# Patient Record
Sex: MALE | Race: BLACK OR AFRICAN AMERICAN
[De-identification: names, ages, dates, MRNs, and addresses within clinical notes are randomized per-mention and may not be internally consistent; named-entity substitution may affect disease eponyms.]

---

## 2018-04-08 ENCOUNTER — HOSPITAL ENCOUNTER (EMERGENCY)
Dept: HOSPITAL 74 - JER | Age: 48
Discharge: HOME | End: 2018-04-08
Payer: COMMERCIAL

## 2018-04-08 VITALS — HEART RATE: 72 BPM | SYSTOLIC BLOOD PRESSURE: 129 MMHG | TEMPERATURE: 98.2 F | DIASTOLIC BLOOD PRESSURE: 70 MMHG

## 2018-04-08 VITALS — BODY MASS INDEX: 55.2 KG/M2

## 2018-04-08 DIAGNOSIS — L50.9: ICD-10-CM

## 2018-04-08 DIAGNOSIS — T78.49XA: Primary | ICD-10-CM

## 2018-04-08 PROCEDURE — 3E033GC INTRODUCTION OF OTHER THERAPEUTIC SUBSTANCE INTO PERIPHERAL VEIN, PERCUTANEOUS APPROACH: ICD-10-PCS

## 2018-04-08 PROCEDURE — 3E0333Z INTRODUCTION OF ANTI-INFLAMMATORY INTO PERIPHERAL VEIN, PERCUTANEOUS APPROACH: ICD-10-PCS

## 2018-04-08 NOTE — PDOC
History of Present Illness





<Meg Patrick - Last Filed: 04/08/18 11:37>





- History of Present Illness


Initial Comments: 


 47 year old male with PMH of ADD and Gout presenting with swollen lumps on his 

face since this AM. States he has had some light itching and swelling of the 

left lower side of his face/ neck every morning for the past month that has 

resolved on its own. However, this morning it was much worse, and he had an 

extremely swollen and painful right ear with multiple swollen lesions around 

his forehead. He has had a cough for the past month along with some rhinorrhea. 

Denies any new medications, linen change, detergent change, pets, or other 

exposures. He took 20 mg of Citirizine without too much relief. Of note, his 

son has multiple allergies. He denies any fevers, chills, nausea, vomiting, 

diarrhea, constipation, SOB, or wheezing.


04/08/18 13:08








<Eric Penny - Last Filed: 04/08/18 15:22>





- General


Chief Complaint: Allergic Reaction


Stated Complaint: SWOLLEN FACE


Time Seen by Provider: 04/08/18 11:10





Past History





<Meg Patrick - Last Filed: 04/08/18 11:37>





- Past Medical History


COPD: No


Other medical history: ADD, GOUT





- Suicide/Smoking/Psychosocial Hx


Smoking History: Never smoked


Information on smoking cessation initiated: No


Hx Alcohol Use: No


Drug/Substance Use Hx: No


Substance Use Type: None





<Eric Penny - Last Filed: 04/08/18 15:22>





- Past Medical History


Allergies/Adverse Reactions: 


 Allergies











Allergy/AdvReac Type Severity Reaction Status Date / Time


 


No Known Allergies Allergy   Verified 04/08/18 10:38











Home Medications: 


Ambulatory Orders





Allopurinol [Zyloprim -] 100 mg PO DAILY 04/08/18 


Dextroamphetamine/Amphetamine [Adderall 10 mg Tablet] 10 mg PO DAILY 04/08/18 


Methylphenidate HCl [Ritalin] 10 mg PO TID 04/08/18 


Prednisone [Prednisone 50 MG TABLETS] 50 mg PO DAILY #4 tablet 04/08/18 











**Review of Systems





- Review of Systems


Constitutional: No: Chills, Diaphoresis, Fever


HEENTM: No: Eye Pain, Blurred Vision


Respiratory: Yes: Cough.  No: Shortness of Breath, Wheezing


Cardiac (ROS): No: Chest Pain, Edema, Irregular Heart Rate, Chest Tightness


ABD/GI: No: Constipated, Diarrhea, Nausea, Poor Appetite, Vomiting


: No: Burning, Dysuria, Discharge


Musculoskeletal: No: Back Pain, Joint Pain


Integumentary: Yes: Lesions, Lumps.  No: Bruising, Erythema


Neurological: No: Headache, Numbness, Paresthesia


Endocrine: No: Flushing





<Eric Penny - Last Filed: 04/08/18 15:22>





*Physical Exam





- Vital Signs


 Last Vital Signs











Temp Pulse Resp BP Pulse Ox


 


 97.7 F   89   18   136/87   100 


 


 04/08/18 10:35  04/08/18 10:35  04/08/18 10:35  04/08/18 10:35  04/08/18 10:35














<Meg Patrick - Last Filed: 04/08/18 11:37>





- Vital Signs


 Last Vital Signs











Temp Pulse Resp BP Pulse Ox


 


 97.7 F   89   18   136/87   100 


 


 04/08/18 10:35  04/08/18 10:35  04/08/18 10:35  04/08/18 10:35  04/08/18 10:35














- Physical Exam


General Appearance: Yes: Nourished, Appropriately Dressed.  No: Apparent 

Distress


HEENT: positive: EOMI, BEN, Normal Voice, Symmetrical, TMs Normal, Pharynx 

Normal, Other (Right ear slightly tender and moderately swollen. Cutaneous 

tissue over left and right mandibular area swollen ).  negative: Normal ENT 

Inspection, Hearing Decreased, TM Bulging, TM Dull, TM Erythema


Neck: positive: Trachea midline, Normal Thyroid, Supple.  negative: Tender, 

Rigid, Stridor


Respiratory/Chest: positive: Lungs Clear, Normal Breath Sounds.  negative: 

Chest Tender, Respiratory Distress, Accessory Muscle Use


Cardiovascular: positive: Regular Rhythm, Regular Rate


Gastrointestinal/Abdominal: positive: Normal Bowel Sounds, Flat, Soft.  negative

: Tender


Lymphatic: positive: Adenopathy.  negative: Tenderness


Musculoskeletal: positive: Normal Inspection.  negative: CVA Tenderness


Extremity: positive: Normal Capillary Refill, Normal Inspection, Normal Range 

of Motion, Pelvis Stable.  negative: Tender


Integumentary: negative: Normal Color (Per above), Dry, Warm


Neurologic: positive: CNs II-XII NML intact, Fully Oriented, Alert, Normal Mood/

Affect, Normal Response, Motor Strength 5/5





<Eric Penny - Last Filed: 04/08/18 15:22>





Medical Decision Making





- Medical Decision Making


 47 year old male with allergic reaction of his face with multiple cutaneous 

lesions. No airway compromise on presentation. Patient remained stable with 

improved symptoms after steroid Methylpred 125 and Benadryl 50 MG. Will send 

home with PCP follow up and Prednisone 50 MG x 4 days.


04/08/18 15:08








<Eric Penny - Last Filed: 04/08/18 15:22>





*DC/Admit/Observation/Transfer





<Meg Patrick - Last Filed: 04/08/18 11:37>





- Discharge Dispostion


Admit: No





<Eric Penny - Last Filed: 04/08/18 15:22>


Diagnosis at time of Disposition: 


Allergic reaction


Qualifiers:


 Encounter type: initial encounter Qualified Code(s): T78.40XA - Allergy, 

unspecified, initial encounter








- Discharge Dispostion


Disposition: HOME


Condition at time of disposition: Improved





- Prescriptions


Prescriptions: 


Prednisone [Prednisone 50 MG TABLETS] 50 mg PO DAILY #4 tablet





- Referrals


Referrals: 


Cornelius Beal MD [Staff Physician] - 


Jefferson Frederick MD [Staff Physician] - 





- Patient Instructions


Printed Discharge Instructions:  DI for General Allergic Reactions


Additional Instructions: 


You likely have an allergy to a food or other product you are using. It is 

important to follow up with an allergist (Dr. Frederick) within the next few days. We 

referred you to Dr. Frederick. You also shlould see a primary care physician within 

the next few weeks. If you don't have one we referred you to Dr. Beal. Please 

use the Prednisone daily and use Benadryl for the itching and swelling as well. 

Please return to the ED if you have new or worsening symptoms despite treatment.





- Post Discharge Activity

## 2018-04-08 NOTE — PDOC
Attending Attestation





- Resident


Resident Name: Eric Penny





- ED Attending Attestation


I have performed the following: I have examined & evaluated the patient, The 

case was reviewed & discussed with the resident, I agree w/resident's findings 

& plan, Exceptions are as noted





- HPI


HPI: 





46 yo M presents with allergic reaction with facial hives and swelling, 

swelling to the R ear. He states it started overnight, has been worsening 

since. He states that the rash is extremely itchy. He did not eat any new 

foods. No new detergents, soaps, creams. No prior similar symptoms, however, he 

has a son who has multiple food allergies.








- Physicial Exam


PE: 





GENERAL: Awake, alert, and fully oriented, in no acute distress


HEAD: No signs of trauma


EYES: PERRLA, EOMI, sclera anicteric, conjunctiva clear


ENT: R auricle with edema and slight erythema, blanches. +Urticarial rash to 

face and scalp, one lesion to the head. Hearing grossly normal, nares patent, 

oropharynx clear without exudates. Moist mucosa. TMs normal B/L. Airway patent, 

no oral lesions.


NECK: Normal ROM, supple, no lymphadenopathy, JVD, or masses


LUNGS: Breath sounds equal, clear to auscultation bilaterally.  No wheezes, and 

no crackles


HEART: Regular rate and rhythm, normal S1 and S2, no murmurs, rubs or gallops


ABDOMEN: Soft, nontender, normoactive bowel sounds.  No guarding, no rebound.  

No masses


EXTREMITIES: Normal range of motion, no edema.  No clubbing or cyanosis. No 

cords, erythema, or tenderness


NEUROLOGICAL: Cranial nerves II through XII grossly intact.  Normal speech, 

normal gait


SKIN: Warm, Dry, normal turgor, no rashes or lesions noted.








- Medical Decision Making





Pt with allergic reaction, urticarial rash. No known allergens, unknown 

inciting factor. Will treat with steroids, H1/H2 blockers, benadryl. Will obs 

in ED. If he is improving will DC home with a course of prednisone. F/u with 

allergist for skin testing in the future.

## 2018-04-11 ENCOUNTER — HOSPITAL ENCOUNTER (OUTPATIENT)
Dept: HOSPITAL 74 - JER | Age: 48
Setting detail: OBSERVATION
LOS: 3 days | Discharge: HOME | End: 2018-04-14
Attending: NURSE PRACTITIONER | Admitting: INTERNAL MEDICINE
Payer: COMMERCIAL

## 2018-04-11 VITALS — BODY MASS INDEX: 45.1 KG/M2

## 2018-04-11 DIAGNOSIS — R11.14: ICD-10-CM

## 2018-04-11 DIAGNOSIS — R50.9: ICD-10-CM

## 2018-04-11 DIAGNOSIS — R21: ICD-10-CM

## 2018-04-11 DIAGNOSIS — M10.9: ICD-10-CM

## 2018-04-11 DIAGNOSIS — K29.00: Primary | ICD-10-CM

## 2018-04-11 DIAGNOSIS — I95.9: ICD-10-CM

## 2018-04-11 DIAGNOSIS — F98.8: ICD-10-CM

## 2018-04-11 DIAGNOSIS — L50.0: ICD-10-CM

## 2018-04-11 LAB
ALBUMIN SERPL-MCNC: 3.2 G/DL (ref 3.4–5)
ALP SERPL-CCNC: 67 U/L (ref 45–117)
ALT SERPL-CCNC: 33 U/L (ref 12–78)
ANION GAP SERPL CALC-SCNC: 7 MMOL/L (ref 8–16)
AST SERPL-CCNC: 15 U/L (ref 15–37)
BASOPHILS # BLD: 0.1 % (ref 0–2)
BILIRUB SERPL-MCNC: 1 MG/DL (ref 0.2–1)
BUN SERPL-MCNC: 22 MG/DL (ref 7–18)
CALCIUM SERPL-MCNC: 8.4 MG/DL (ref 8.5–10.1)
CHLORIDE SERPL-SCNC: 104 MMOL/L (ref 98–107)
CO2 SERPL-SCNC: 25 MMOL/L (ref 21–32)
CREAT SERPL-MCNC: 1.3 MG/DL (ref 0.7–1.3)
DEPRECATED RDW RBC AUTO: 14 % (ref 11.9–15.9)
EOSINOPHIL # BLD: 0.1 % (ref 0–4.5)
GLUCOSE SERPL-MCNC: 160 MG/DL (ref 74–106)
HCT VFR BLD CALC: 51.8 % (ref 35.4–49)
HGB BLD-MCNC: 17.7 GM/DL (ref 11.7–16.9)
INR BLD: 1.35 (ref 0.82–1.09)
LIPASE SERPL-CCNC: 50 U/L (ref 73–393)
LYMPHOCYTES # BLD: 27.2 % (ref 8–40)
MCH RBC QN AUTO: 29.1 PG (ref 25.7–33.7)
MCHC RBC AUTO-ENTMCNC: 34.1 G/DL (ref 32–35.9)
MCV RBC: 85.2 FL (ref 80–96)
MONOCYTES # BLD AUTO: 7 % (ref 3.8–10.2)
NEUTROPHILS # BLD: 65.6 % (ref 42.8–82.8)
PLATELET # BLD AUTO: 276 K/MM3 (ref 134–434)
PMV BLD: 9.2 FL (ref 7.5–11.1)
POTASSIUM SERPLBLD-SCNC: 4.4 MMOL/L (ref 3.5–5.1)
PROT SERPL-MCNC: 6.3 G/DL (ref 6.4–8.2)
PT PNL PPP: 15.3 SEC (ref 9.98–11.88)
RBC # BLD AUTO: 6.08 M/MM3 (ref 4–5.6)
SODIUM SERPL-SCNC: 136 MMOL/L (ref 136–145)
WBC # BLD AUTO: 3 K/MM3 (ref 4–10)

## 2018-04-11 PROCEDURE — G0378 HOSPITAL OBSERVATION PER HR: HCPCS

## 2018-04-11 PROCEDURE — 3E0333Z INTRODUCTION OF ANTI-INFLAMMATORY INTO PERIPHERAL VEIN, PERCUTANEOUS APPROACH: ICD-10-PCS | Performed by: NURSE PRACTITIONER

## 2018-04-11 PROCEDURE — 3E0337Z INTRODUCTION OF ELECTROLYTIC AND WATER BALANCE SUBSTANCE INTO PERIPHERAL VEIN, PERCUTANEOUS APPROACH: ICD-10-PCS | Performed by: NURSE PRACTITIONER

## 2018-04-11 PROCEDURE — 3E033GC INTRODUCTION OF OTHER THERAPEUTIC SUBSTANCE INTO PERIPHERAL VEIN, PERCUTANEOUS APPROACH: ICD-10-PCS | Performed by: NURSE PRACTITIONER

## 2018-04-11 PROCEDURE — 3E033NZ INTRODUCTION OF ANALGESICS, HYPNOTICS, SEDATIVES INTO PERIPHERAL VEIN, PERCUTANEOUS APPROACH: ICD-10-PCS | Performed by: NURSE PRACTITIONER

## 2018-04-11 RX ADMIN — PANTOPRAZOLE SODIUM SCH MG: 40 INJECTION, POWDER, FOR SOLUTION INTRAVENOUS at 22:58

## 2018-04-11 RX ADMIN — DIPHENHYDRAMINE HCL PRN MG: 25 CAPSULE ORAL at 22:58

## 2018-04-11 RX ADMIN — SUCRALFATE SCH GM: 1 TABLET ORAL at 22:57

## 2018-04-11 RX ADMIN — SODIUM CHLORIDE SCH MLS/HR: 9 INJECTION, SOLUTION INTRAVENOUS at 17:41

## 2018-04-11 NOTE — PDOC
History of Present Illness





- History of Present Illness


Initial Comments: 





04/11/18 11:29


The patient is a 47 year old male with a significant PMH of ADD, Gout, and 

"partial closed off kidney presenting with new onset of fatigue, generalized 

weakness, lightheadedness, nausea and vomiting this morning after being seen 

for diffuse hives on 4/8/18 and sent home with steroids for treatment. The 

patient states the hives have not improved since his visit on 4/8/18. He states 

he woke up to use to the restroom today and "felt weak like I was going to faint

". He denies every fainting or losing consciousness but reports feeling very 

weak. The patient reports one similar episode of weakness yesterday while at 

home as well. The patient's wife states she has been using calamine lotion and 

benadryl as well as the oral steroids to treat the hives without improvement. 

The patient reports a generalized abdominal discomfort which he describes as 

being "queezy". He reports he can not even keep down water secondary to nausea. 

He states he is concerned for his kidneys because he "didn't realize the 

steroids might affect the kidney until after starting the steroids." Secondarily

, he stated he has an appointment with an allergist next Wednesday. 





He denies chest pain, shortness of breath, headache and dizziness. He denies 

fever, chills, diarrhea and constipation. He denies dysuria, frequency, urgency 

and hematuria. 








<Leyla Roberts - Last Filed: 04/11/18 13:07>





<Anabel Arriaga - Last Filed: 04/11/18 16:20>





- General


Chief Complaint: Weakness


Stated Complaint: REVISIT/ SWOLLEN FACE, VOMITING


Time Seen by Provider: 04/11/18 11:03





Past History





<Leyla Roberts - Last Filed: 04/11/18 13:07>





- Past Medical History


COPD: No





- Suicide/Smoking/Psychosocial Hx


Smoking History: Never smoked


Have you smoked in the past 12 months: No


Information on smoking cessation initiated: No


Hx Alcohol Use: No


Drug/Substance Use Hx: No


Substance Use Type: None





<Anabel Arriaga - Last Filed: 04/11/18 16:20>





- Past Medical History


Allergies/Adverse Reactions: 


 Allergies











Allergy/AdvReac Type Severity Reaction Status Date / Time


 


No Known Allergies Allergy   Verified 04/11/18 10:07











Home Medications: 


Ambulatory Orders





Allopurinol [Zyloprim -] 100 mg PO DAILY 04/08/18 


Dextroamphetamine/Amphetamine [Adderall 10 mg Tablet] 10 mg PO DAILY 04/08/18 


Methylphenidate HCl [Ritalin] 10 mg PO DAILY 04/08/18 


Prednisone [Prednisone 50 MG TABLETS] 50 mg PO DAILY #4 tablet 04/08/18 











**Review of Systems





- Review of Systems


Able to Perform ROS?: Yes


Comments:: 





04/11/18 11:38





CONSTITUTIONAL:


(+) weakness and fatigue. Absent: fever, no chills,


EYES:


Absent: visual changes


ENT:


Absent: ear pain, no sore throat


CARDIOVASCULAR:


Absent: chest pain, no palpitations


RESPIRATORY:


Absent: cough, no SOB


GASTROINTESTINAL:


(+) abdominal pain, nausea, vomiting, Absent:no constipation, no diarrhea


GENITOURINARY:


Absent: dysuria, no frequency, no hematuria


MUSCULOSKELETAL:


Absent: back pain, no arthralgia, no myalgia


SKIN:


(+) hives, rash


NEURO:


(+) lightheadedness. Absent: headache











<Leyla Roberts - Last Filed: 04/11/18 13:07>





*Physical Exam





- Vital Signs


 Last Vital Signs











Temp Pulse Resp BP Pulse Ox


 


 98.8 F   125 H  18   90/62   100 


 


 04/11/18 10:07  04/11/18 10:07  04/11/18 10:07  04/11/18 10:07  04/11/18 10:07














- Physical Exam


Comments: 





04/11/18 11:39


GENERAL: The patient is in no acute distress.


HEAD: Normal with no signs of trauma.


EYES: PERRLA, EOMI, sclera anicteric, conjunctiva clear.


ENT: (+) Dry mucous membranes. Ears normal, nares patent, oropharynx clear 

without exudates. 


NECK: Normal range of motion, supple without lymphadenopathy, JVD, or masses.


LUNGS: Breath sounds equal, clear to auscultation bilaterally. No wheezes, and 

no crackles.


HEART: (+) tachycardic rate. Regular rhythm, normal S1 and S2 without murmur, 

rub or gallop.


ABDOMEN: Soft, nontender, normoactive bowel sounds. No guarding, no rebound. No 

masses palpable.


EXTREMITIES: Normal range of motion, no edema. No clubbing or cyanosis. No 

erythema, or tenderness.


NEUROLOGICAL: Cranial nerves II through XII grossly intact. Normal speech. No 

focal neurological deficits.


MUSCULOSKELETAL: Back non-tender to palpation, no CVA tenderness


SKIN: (+) widely diffuse urticarial lesions to extremities, abdomen, back, trunk

, neck. 





<Leyla Roberts - Last Filed: 04/11/18 13:07>





- Vital Signs


 Last Vital Signs











Temp Pulse Resp BP Pulse Ox


 


 98.8 F   125 H  18   90/62   100 


 


 04/11/18 10:07  04/11/18 10:07  04/11/18 10:07  04/11/18 10:07  04/11/18 10:07














<Anabel Arriaga - Last Filed: 04/11/18 16:20>





ED Treatment Course





- LABORATORY


CBC & Chemistry Diagram: 


 04/11/18 12:11





 04/11/18 12:11





<Leyla Roberts - Last Filed: 04/11/18 13:07>





- LABORATORY


CBC & Chemistry Diagram: 


 04/11/18 12:11





 04/11/18 12:11





<Anabel Arriaga - Last Filed: 04/11/18 16:20>





Medical Decision Making





- Medical Decision Making





04/11/18 12:35


Mr Villareal is a 48 yo M who presents to the ER hypotensive with a complaint of 

intractable nausea., queasiness


One fever noted


No chest pain


No shortenss of breath


Pt was recently see in the ER for allergic reaction


Pt has persistent urticarial lesions


No tongue or throat swelling 





EKG:


Sinus tachycardia, rate of 103 bpm, axis is normal, intervals are normal, no ST 

elevations or depressions, T waves are inverted in leads  and aVF


04/11/18 13:48


 Laboratory Tests











  04/11/18 04/11/18 04/11/18





  12:11 12:11 12:11


 


WBC  3.0 L  


 


Hgb  17.7 H  


 


Hct  51.8 H  


 


Plt Count  276  


 


PT with INR   15.30 H 


 


INR   1.35 H 


 


Sodium    136


 


Potassium    4.4


 


Chloride    104


 


Carbon Dioxide    25


 


BUN    22 H


 


Creatinine    1.3


 


Random Glucose    160 H


 


Lactic Acid   


 


Total Bilirubin    1.0


 


Creatine Kinase   


 


Troponin I   


 


Lipase    50 L














  04/11/18 04/11/18





  12:11 12:11


 


WBC  


 


Hgb  


 


Hct  


 


Plt Count  


 


PT with INR  


 


INR  


 


Sodium  


 


Potassium  


 


Chloride  


 


Carbon Dioxide  


 


BUN  


 


Creatinine  


 


Random Glucose  


 


Lactic Acid  2.3 H* 


 


Total Bilirubin  


 


Creatine Kinase   65


 


Troponin I   < 0.02


 


Lipase  











04/11/18 16:14


Pt reports continued abdominal pain


CT negative for acute pathology - appendix nml, no sbo


Pt continue to have nausea and vomiting


Will do US


Will give additional Zofran


Will continue to hydrate


Will 


04/11/18 16:19


case reviewed with hospitalist


Pt pending CT


Symptoms possibly related to gastritis due to prednisone


Will continue to hydrate, will give pepcid, will give more zofran





Clinical Impression: gastritis, initial presentation





<Anabel Arriaga - Last Filed: 04/11/18 16:20>





*DC/Admit/Observation/Transfer





- Attestations


Scribe Attestion: 





04/11/18 11:40





Documentation prepared by Leyla Roberts, acting as medical scribe for Anabel Arriaga MD





<Leyla Roberts - Last Filed: 04/11/18 13:07>





- Discharge Dispostion


Admit: Yes





<Anabel Arriaga - Last Filed: 04/11/18 16:20>


Diagnosis at time of Disposition: 


Vomiting bile


Qualifiers:


 Nausea presence: with nausea Qualified Code(s): R11.14 - Bilious vomiting





Gastritis


Qualifiers:


 Gastritis type: unspecified gastritis Chronicity: acute Gastritis bleeding: 

without bleeding Qualified Code(s): K29.00 - Acute gastritis without bleeding








- Discharge Dispostion


Condition at time of disposition: Stable

## 2018-04-11 NOTE — PN
Teaching Attending Note


Name of Resident: Jama Jiménez





ATTENDING PHYSICIAN STATEMENT





I saw and evaluated the patient.


I reviewed the resident's note and discussed the case with the resident.


I agree with the resident's findings and plan as documented.








SUBJECTIVE:








OBJECTIVE:


 Vital Signs











 Period  Temp  Pulse  Resp  BP Sys/Acosta  Pulse Ox


 


 Last 24 Hr  98.3 F-98.8 F    18  /62-94  








 Laboratory Tests











  04/11/18 04/11/18 04/11/18





  12:11 12:11 12:11


 


WBC  3.0 L  


 


RBC  6.08 H  


 


Hgb  17.7 H  


 


Hct  51.8 H  


 


MCV  85.2  


 


MCH  29.1  


 


MCHC  34.1  


 


RDW  14.0  


 


Plt Count  276  


 


MPV  9.2  


 


Neutrophils %  65.6  


 


Lymphocytes %  27.2  


 


Monocytes %  7.0  


 


Eosinophils %  0.1  


 


Basophils %  0.1  


 


PT with INR   15.30 H 


 


INR   1.35 H 


 


Sodium    136


 


Potassium    4.4


 


Chloride    104


 


Carbon Dioxide    25


 


Anion Gap    7 L


 


BUN    22 H


 


Creatinine    1.3


 


Creat Clearance w eGFR    59.70


 


Random Glucose    160 H


 


Lactic Acid   


 


Calcium    8.4 L


 


Total Bilirubin    1.0


 


AST    15


 


ALT    33


 


Alkaline Phosphatase    67


 


Creatine Kinase   


 


Troponin I   


 


Total Protein    6.3 L


 


Albumin    3.2 L


 


Lipase    50 L














  04/11/18 04/11/18





  12:11 12:11


 


WBC  


 


RBC  


 


Hgb  


 


Hct  


 


MCV  


 


MCH  


 


MCHC  


 


RDW  


 


Plt Count  


 


MPV  


 


Neutrophils %  


 


Lymphocytes %  


 


Monocytes %  


 


Eosinophils %  


 


Basophils %  


 


PT with INR  


 


INR  


 


Sodium  


 


Potassium  


 


Chloride  


 


Carbon Dioxide  


 


Anion Gap  


 


BUN  


 


Creatinine  


 


Creat Clearance w eGFR  


 


Random Glucose  


 


Lactic Acid  2.3 H* 


 


Calcium  


 


Total Bilirubin  


 


AST  


 


ALT  


 


Alkaline Phosphatase  


 


Creatine Kinase   65


 


Troponin I   < 0.02


 


Total Protein  


 


Albumin  


 


Lipase  








 Home Medications











 Medication  Instructions  Recorded


 


Allopurinol [Zyloprim -] 100 mg PO DAILY 04/08/18


 


Dextroamphetamine/Amphetamine 10 mg PO DAILY 04/08/18





[Adderall 10 mg Tablet]  


 


Methylphenidate HCl [Ritalin] 10 mg PO DAILY 04/08/18


 


Prednisone [Prednisone 50 MG 50 mg PO DAILY #4 tablet 04/08/18





TABLETS]  














ASSESSMENT AND PLAN:

## 2018-04-11 NOTE — HP
CHIEF COMPLAINT: nausea, vomiting, pain abdomen.





PCP: clarke willams [phone]





HISTORY OF PRESENT ILLNESS:  He reports that this morning e has epigastria pain

, non radiating, burning type, got better after morphine associated with 

vomiting. Has 4 episodes of vomiting, bile color, no blood. Denies blood in 

stool, denies diarrhoea, denies yellow discoloration of eyes, denies change in 

stool color, denies blood transfusion history, denies iv drug abuse.  reports 

he ate pizza on Saturday and after that he develop rash all over the body, 

rashes are not getting better, reports they goes away and new one shows 

up.Denies difficulty in swallowing and breathing.  Denies new soap, shampoo, 

clothes, bed sheet, medication, food. Denies hiking, Denies working in wood. He 

also reports couple of episodes of fever max 102.4 Denies burning micturation, 

change in frequency, swelling in joints, chills. Denies myalgia, arthalgia. 





In Er found to be ypotensive: improved after IV fluid.








ER course was notable for:


(1)cbc, cmp


(2)NS 


(3)morphine 








home meds: allopurinol, methylphenidate


Recent Travel: no





PAST MEDICAL HISTORY: ADD, gout, partial obstruction of kidney congenital ( 

diagnose 2 years ago, state whole work up was done and its stable, don't 

remember name of nephrology)





PAST SURGICAL HISTORY: none





Social History: work as  


Smoking:no


Alcohol:rarely 


Drugs: no





Family History: not relevant. 


Allergies





No Known Allergies Allergy (Verified 04/11/18 10:07)


 








HOME MEDICATIONS:


 Home Medications











 Medication  Instructions  Recorded


 


Allopurinol [Zyloprim -] 100 mg PO DAILY 04/08/18


 


Dextroamphetamine/Amphetamine 10 mg PO DAILY 04/08/18





[Adderall 10 mg Tablet]  


 


Methylphenidate HCl [Ritalin] 10 mg PO DAILY 04/08/18


 


Prednisone [Prednisone 50 MG 50 mg PO DAILY #4 tablet 04/08/18





TABLETS]  








REVIEW OF SYSTEMS


CONSTITUTIONAL: 


Absent:  fever, chills, diaphoresis, generalized weakness, malaise, loss of 

appetite, weight change


HEENT: 


Absent:  rhinorrhea, nasal congestion, throat pain, throat swelling, difficulty 

swallowing, mouth swelling, ear pain,


CARDIOVASCULAR: 


Absent: chest pain, syncope, palpitations, irregular heart rate, lightheadedness

, peripheral edema


RESPIRATORY: 


Absent: cough, shortness of breath, dyspnea with exertion,  wheezing, stridor, 

hemoptysis


GASTROINTESTINAL:


Absent: abdominal pain, abdominal distension, nausea, vomiting, diarrhea, 

constipation, melena, hematochezia


GENITOURINARY: 


Absent: dysuria, frequency, urgency, hesitancy, hematuria, flank pain, genital 

pain


MUSCULOSKELETAL: 


Absent: myalgia, arthralgia, joint swelling, back pain, neck pain


SKIN: as above


HEMATOLOGIC/IMMUNOLOGIC: 


Absent: easy bleeding, easy bruising, 


ENDOCRINE:


Absent: unexplained weight gain, unexplained weight loss,


NEUROLOGIC: 


Absent: headache, focal weakness or paresthesias, 


PSYCHIATRIC: 


Absent: anxiety, depression, 





PHYSICAL EXAMINATION


 Vital Signs - 24 hr











  04/11/18 04/11/18 04/11/18





  10:07 11:50 15:18


 


Temperature 98.8 F 98.3 F 


 


Pulse Rate 125 H  


 


Pulse Rate [  105 H 98 H





Apical]   


 


Respiratory 18  





Rate   


 


Blood Pressure 90/62  


 


Blood Pressure  139/94 119/75





[Left Arm]   


 


O2 Sat by Pulse 100 98 95





Oximetry (%)   











GENERAL: Awake, alert, and fully oriented, 


HEAD: Normal with no signs of trauma.


EYES: Pupils equal, round and reactive to light, extraocular movements intact, 

sclera anicteric, conjunctiva clear. No lid lag. no yellow discoloration


EARS, NOSE, THROAT:  oropharynx clear without exudates. Moist mucous membranes. 

no erthema or faby


NECK: Normal range of motion, 


LUNGS: Breath sounds equal, clear to auscultation bilaterally. No wheezes, and 

no crackles. No accessory muscle use.


HEART: Regular rate and rhythm, normal S1 and S2 without murmur, 


ABDOMEN: Soft, nontender, not distended, normoactive bowel sounds, no guarding, 

no rebound, no masses. 


MUSCULOSKELETAL: Normal range of motion at all joints. No bony deformities or 

tenderness.


UPPER EXTREMITIES: 2+ pulses, warm, well-perfused. No cyanosis. No clubbing. No 

peripheral edema.


LOWER EXTREMITIES:  warm, well-perfused. No calf tenderness. No peripheral 

edema. 


NEUROLOGICAL:  Cranial nerves II-XII intact. Normal speech. 


PSYCHIATRIC: Cooperative. Good eye contact. Appropriate mood and affect.


SKIN: Warm, dry, diffuse urticarial rash with some target like lesions.


 Laboratory Results - last 24 hr











  04/11/18 04/11/18 04/11/18





  12:11 12:11 12:11


 


WBC  3.0 L  


 


RBC  6.08 H  


 


Hgb  17.7 H  


 


Hct  51.8 H  


 


MCV  85.2  


 


MCH  29.1  


 


MCHC  34.1  


 


RDW  14.0  


 


Plt Count  276  


 


MPV  9.2  


 


Neutrophils %  65.6  


 


Lymphocytes %  27.2  


 


Monocytes %  7.0  


 


Eosinophils %  0.1  


 


Basophils %  0.1  


 


PT with INR   15.30 H 


 


INR   1.35 H 


 


Sodium    136


 


Potassium    4.4


 


Chloride    104


 


Carbon Dioxide    25


 


Anion Gap    7 L


 


BUN    22 H


 


Creatinine    1.3


 


Creat Clearance w eGFR    59.70


 


Random Glucose    160 H


 


Lactic Acid   


 


Calcium    8.4 L


 


Total Bilirubin    1.0


 


AST    15


 


ALT    33


 


Alkaline Phosphatase    67


 


Creatine Kinase   


 


Troponin I   


 


Total Protein    6.3 L


 


Albumin    3.2 L


 


Lipase    50 L














  04/11/18 04/11/18 04/11/18





  12:11 12:11 16:40


 


WBC   


 


RBC   


 


Hgb   


 


Hct   


 


MCV   


 


MCH   


 


MCHC   


 


RDW   


 


Plt Count   


 


MPV   


 


Neutrophils %   


 


Lymphocytes %   


 


Monocytes %   


 


Eosinophils %   


 


Basophils %   


 


PT with INR   


 


INR   


 


Sodium   


 


Potassium   


 


Chloride   


 


Carbon Dioxide   


 


Anion Gap   


 


BUN   


 


Creatinine   


 


Creat Clearance w eGFR   


 


Random Glucose   


 


Lactic Acid  2.3 H*   1.4


 


Calcium   


 


Total Bilirubin   


 


AST   


 


ALT   


 


Alkaline Phosphatase   


 


Creatine Kinase   65 


 


Troponin I   < 0.02 


 


Total Protein   


 


Albumin   


 


Lipase   








CT abdomen : Evaluation of the solid viscera, bowel and vessels is limited 

without contrast. There is discoid atelectasis in both lung bases, right more 

than left. The heart is not enlarged. There is hepatic steatosis. Normal liver 

size and contour. The gallbladder is not pathologically distended. The common 

bile duct is not dilated. The unenhanced pancreas is unremarkable. Normal size 

spleen. There is no adrenal gland mass. The kidneys are normal size. There is 

reverse rotation of the left kidney, a developmental variation. There are no 

renal or ureteral calculi. No hydroureteronephrosis. There are likely left 

renal sinus cysts. Normal caliber abdominal aorta. No pathologically enlarged 

lymph nodes within the abdomen or pelvis by CT size criteria. No dilated loops 

of large or small bowel to suggest obstruction. A normal-appearing appendix is 

visualized. There are sigmoid diverticula with no evidence of diverticulitis. 

There is no free intraperitoneal air or ascites. The urinary bladder is 

underdistended, limiting evaluation. There is no evidence of urinary bladder 

calculus. The prostate gland is normal size. No acute fracture in the 

visualized bones. IMPRESSION: 1. No obstructive uropathy. No evidence of 

urinary tract calculus. 2. Normal-appearing appendix. No evidence of bowel 

obstruction or diverticulitis. 3. Hepatic steatosis. 














ASSESSMENT/PLAN: 48 y/o m with PMH of gout came to hospital on Sunday  for 

generalized urticaria. He was discharged on Benadryl and prednisone( 50mg 

started on Sunday). Now came back for lightheadedness, generalized weaken, 

vomiting, pain abdomen.








Gastritis likely from prednisone. Less likely gall stone ds. 


Iv protonix  40 bid


sucralfate po


IV fluid 


clear liquid if tolerate


monitor intake and output


monitor vitals


usg liver pending.


morphine prn for pain.


zofran for vomiting/nausea





Genralized rash : likely urticaria, less likely lyme


benadryl 25mg po q6h prn


hold steroid for now, so rash doesn't subside   before derma and id evaluation. 

Although patient took steroid for 3 days with no benefit. 


SUSAN, CRP, ESR


ID consult


Derma consult.


Patient has outpatient appointment with immunologist and allergist next 

Wednesday.





Lacticacidosis: likely from hypotension from vomiting or from inflammatory 

process.





ADD


continue home meds.





Fluid: Ns 100ml/hr


electrolyte: repeat in a 


Nutrition; clear liquid. 





Dvt pro: scd 


gi pro; protonix and sucralfate. 





dispo; observation.











Visit type





- Emergency Visit


Emergency Visit: Yes


Care time: The patient presented to the Emergency Department on the above date 

and was hospitalized for further evaluation of their emergent condition.





- New Patient


This patient is new to me today: Yes


Date on this admission: 04/11/18





- Critical Care


Critical Care patient: No





Hospitalist Screening





- Colonoscopy Questionnaire


Colonoscopy Questionnaire: 





Colonoscopy Questionnaire








-   Patient:


50 - 75 years old and never had a screening colonoscopy: Unknown


History of colon or rectal polyps, or CA: Unknown


History of IBD, Crohn's disease or UC: Unknown


History of abdominal radiation therapy as a child: Unknown





-   Relative:


1 with colon or rectal CA, or polyps at age 60 or younger: Unknown


Colon or rectal CA diagnosed at age 45 or younger: Unknown


Multiple relatives with colon or rectal CA: Unknown





-   Outcome:


Screening Result: Negative Screen

## 2018-04-11 NOTE — EKG
Test Reason : 

Blood Pressure : ***/*** mmHG

Vent. Rate : 103 BPM     Atrial Rate : 103 BPM

   P-R Int : 140 ms          QRS Dur : 086 ms

    QT Int : 308 ms       P-R-T Axes : 068 036 021 degrees

   QTc Int : 403 ms

 

SINUS TACHYCARDIA

POSSIBLE LEFT ATRIAL ENLARGEMENT

NONSPECIFIC T WAVE ABNORMALITY

ABNORMAL ECG

NO PREVIOUS ECGS AVAILABLE

Confirmed by HJ MUNIZ, CHAKA ([id number]) on 4/11/2018 3:36:47 PM

 

Referred By:             Confirmed By:CHAKA PEÑALOZA MD

## 2018-04-12 LAB
ALBUMIN SERPL-MCNC: 2.6 G/DL (ref 3.4–5)
ALP SERPL-CCNC: 58 U/L (ref 45–117)
ALT SERPL-CCNC: 20 U/L (ref 12–78)
ANION GAP SERPL CALC-SCNC: 6 MMOL/L (ref 8–16)
AST SERPL-CCNC: 7 U/L (ref 15–37)
BASOPHILS # BLD: 0.1 % (ref 0–2)
BILIRUB SERPL-MCNC: 0.9 MG/DL (ref 0.2–1)
BUN SERPL-MCNC: 19 MG/DL (ref 7–18)
CALCIUM SERPL-MCNC: 7.9 MG/DL (ref 8.5–10.1)
CHLORIDE SERPL-SCNC: 107 MMOL/L (ref 98–107)
CO2 SERPL-SCNC: 27 MMOL/L (ref 21–32)
CREAT SERPL-MCNC: 1.3 MG/DL (ref 0.7–1.3)
DEPRECATED RDW RBC AUTO: 13.8 % (ref 11.9–15.9)
EOSINOPHIL # BLD: 1.4 % (ref 0–4.5)
GLUCOSE SERPL-MCNC: 104 MG/DL (ref 74–106)
HCT VFR BLD CALC: 48.3 % (ref 35.4–49)
HGB BLD-MCNC: 16.2 GM/DL (ref 11.7–16.9)
INR BLD: 1.4 (ref 0.82–1.09)
LYMPHOCYTES # BLD: 35.8 % (ref 8–40)
MAGNESIUM SERPL-MCNC: 2.2 MG/DL (ref 1.8–2.4)
MCH RBC QN AUTO: 29.2 PG (ref 25.7–33.7)
MCHC RBC AUTO-ENTMCNC: 33.6 G/DL (ref 32–35.9)
MCV RBC: 87 FL (ref 80–96)
MONOCYTES # BLD AUTO: 5.4 % (ref 3.8–10.2)
NEUTROPHILS # BLD: 57.3 % (ref 42.8–82.8)
PHOSPHATE SERPL-MCNC: 2 MG/DL (ref 2.5–4.9)
PLATELET # BLD AUTO: 273 K/MM3 (ref 134–434)
PMV BLD: 9.2 FL (ref 7.5–11.1)
POTASSIUM SERPLBLD-SCNC: 4.4 MMOL/L (ref 3.5–5.1)
PROT SERPL-MCNC: 5.4 G/DL (ref 6.4–8.2)
PT PNL PPP: 15.8 SEC (ref 9.98–11.88)
RBC # BLD AUTO: 5.55 M/MM3 (ref 4–5.6)
SODIUM SERPL-SCNC: 140 MMOL/L (ref 136–145)
WBC # BLD AUTO: 3.6 K/MM3 (ref 4–10)

## 2018-04-12 RX ADMIN — TRIAMCINOLONE ACETONIDE SCH APPLIC: 1 CREAM TOPICAL at 22:21

## 2018-04-12 RX ADMIN — SODIUM CHLORIDE SCH MLS/HR: 9 INJECTION, SOLUTION INTRAVENOUS at 21:55

## 2018-04-12 RX ADMIN — SODIUM CHLORIDE SCH MLS/HR: 9 INJECTION, SOLUTION INTRAVENOUS at 18:44

## 2018-04-12 RX ADMIN — DIPHENHYDRAMINE HCL PRN MG: 25 CAPSULE ORAL at 05:20

## 2018-04-12 RX ADMIN — DIPHENHYDRAMINE HCL PRN MG: 25 CAPSULE ORAL at 11:35

## 2018-04-12 RX ADMIN — PANTOPRAZOLE SODIUM SCH MG: 40 INJECTION, POWDER, FOR SOLUTION INTRAVENOUS at 21:49

## 2018-04-12 RX ADMIN — PANTOPRAZOLE SODIUM SCH MG: 40 INJECTION, POWDER, FOR SOLUTION INTRAVENOUS at 09:17

## 2018-04-12 RX ADMIN — SUCRALFATE SCH GM: 1 TABLET ORAL at 21:49

## 2018-04-12 RX ADMIN — SUCRALFATE SCH GM: 1 TABLET ORAL at 09:14

## 2018-04-12 RX ADMIN — SODIUM CHLORIDE SCH MLS/HR: 9 INJECTION, SOLUTION INTRAVENOUS at 05:20

## 2018-04-12 RX ADMIN — METHYLPREDNISOLONE SODIUM SUCCINATE SCH MG: 40 INJECTION, POWDER, FOR SOLUTION INTRAMUSCULAR; INTRAVENOUS at 18:44

## 2018-04-12 NOTE — CONSULT
Consult


Consult Specialty:: Dermatology





- History Source


History Provided By: Patient





- Past Medical History


Rheumatology: Yes: Gout





- Alcohol/Substance Use


Hx Alcohol Use: No





- Smoking History


Smoking history: Never smoked


Have you smoked in the past 12 months: No





Home Medications





- Allergies


Allergies/Adverse Reactions: 


 Allergies











Allergy/AdvReac Type Severity Reaction Status Date / Time


 


No Known Allergies Allergy   Verified 04/11/18 10:07














- Home Medications


Home Medications: 


Ambulatory Orders





Allopurinol [Zyloprim -] 100 mg PO DAILY 04/08/18 


Dextroamphetamine/Amphetamine [Adderall 10 mg Tablet] 10 mg PO DAILY 04/08/18 


Methylphenidate HCl [Ritalin] 10 mg PO DAILY 04/08/18 


Prednisone [Prednisone 50 MG TABLETS] 50 mg PO DAILY #4 tablet 04/08/18 











Family Disease History





- Family Disease History


Family History: Unable to Obtain





Physical Exam


Vital Signs: 


 Vital Signs











Temperature  99.5 F   04/12/18 18:00


 


Pulse Rate  104 H  04/12/18 18:00


 


Respiratory Rate  20   04/12/18 18:00


 


Blood Pressure  144/77   04/12/18 18:00


 


O2 Sat by Pulse Oximetry (%)  98   04/12/18 18:00











Labs: 


 CBC, BMP





 04/12/18 07:40 





 04/12/18 07:40 











Assessment/Plan


47 yr old mad reports that he developed a generalized rash on Sunday for which 

he received treatment at ER and was discharged . He continued to get new 

lesions on skin that was also accompanied by episodes of sweating and symptoms 

of fainting.he was admitted yesterday with a generalized urticarial eruption 

including lower lip swelling. He denies shortness of breath, tongue or throat 

symptoms.\





He does not know of or recall ingesting or being exposed to any new medications

, foods or OTC preparations.  


on exam he has urticarial lesions on all extremities some on anterior hair line 

and scattered lesions on the chest. The lesions are pruritic and slightly 

painful to touch. He has edema of arms and hands.





Diagnosis.  Idiopathic Urticaria. Rx Hydroxyzine 25mg qam  Benadryl Q6 prn  and 

continuation of IV steroids. application of triamcinolone 0.1 % cream may also 

be helpful. Rheumatology and or Allergy consult may also be helpful to 

determine any underlying etiology

## 2018-04-12 NOTE — PN
Progress Note (short form)





- Note


Progress Note: 





ID








Generalized urticarial rash with lip swelling several days seen ER and given 

prednisone without improvement


Yesterday felt week like going to fall out Also febrile at home 102 shivering 

per wife.  No localizing complaints


Take Allopurinol for gout but this is an old medication for home.   


 Selected Entries











  04/12/18





  06:00


 


Temperature 97.8 F


 


Pulse Rate 69


 


Respiratory 20





Rate 


 


Blood Pressure 121/64








Assessment


Severe uriticaria


Allergic urticaria


Fever ? source Also leukopenia noted high CRP afebrile





Plan


Blood and urine cultures


Observe fever off antibiotics


Solumedrol 80mg q 8 H


HIV testing


Dermatology evaluation





Jhon MUNIZ





Problem List





- Problems


(1) Allergic reaction


Code(s): T78.40XA - ALLERGY, UNSPECIFIED, INITIAL ENCOUNTER   


Qualifiers: 


   Encounter type: initial encounter   Qualified Code(s): T78.40XA - Allergy, 

unspecified, initial encounter   





(2) Acute urticaria


Code(s): L50.8 - OTHER URTICARIA   





(3) Fever


Code(s): R50.9 - FEVER, UNSPECIFIED

## 2018-04-12 NOTE — CONS
INFECTIOUS DISEASE CONSULTATION

 

DATE OF CONSULTATION:

 

DATE OF DICTATION:  04/12/2018

 

This is a 47-year-old  male who was admitted to the hospital with

chief complaint of generalized urticaria.  The patient was recently seen in the

emergency room for onset of hives and sent home with a prescription for prednisone 50

mg which he took earlier this week.  The rash did not improve, however, and

yesterday, according to his wife and the patient, he developed onset of temperature

to 102.  He also had shaking chills but denied any localizing complaints.  Here in

the emergency room, he complains of diffuse rash and facial swelling.  He has no

fever, and his vital signs are stable.  He has a history of gout for which he takes

allopurinol, but this is an old medication which he has tolerated well in the past. 

He is on no other medications, though notes a history of partial obstruction of the

kidney due to a congenital abnormality.

 

SOCIAL HISTORY:  Works as a  in [place] and commutes from [employer] on

the train.  He does not smoke or use drugs.  He is .

 

FAMILY HISTORY:  Reviewed and noncontributory.

 

REVIEW OF SYSTEMS:

Respiratory:  No cough, shortness of breath, sore throat.

Cardiac:  No chest pain, palpitations, syncope.

Gastrointestinal:  No abdominal pain.  Three episodes of vomiting noted yesterday. 

Currently, no nausea, blood per rectum, diarrhea.

Genitourinary:  No dysuria, hematuria, urinary frequency.

 

PHYSICAL EXAMINATION:

General:  He was an alert male in no acute distress.

Vital Signs:  Temperature was 98, pulse 98, blood pressure 120/75, respirations 9.5.

Skin:  Large urticarial plaques coalescing on the lower extremities, thighs.

 

DIAGNOSTIC DATA:  The white count is 3.0 with a hemoglobin of 17.7, platelets of 276,

and normal differential count.  BUN 19, creatinine 1.3.  Bilirubin 1.0.  AST 15.  CRP

22.7.

 

Abdominal CT obtained shows no obstructive uropathy, essentially normal except for

hepatic steatosis.

 

ASSESSMENT:

1.  Severe urticaria.

2.  Allergic urticaria with facial swelling and angioedema.

3.  History of fever and chills with shivering but no localizing complaints

currently, and he is here afebrile with stable vital signs.

 

I would prefer not to give him any antibiotic or other medications at this time.  He

does need parenteral steroids, and I have given him 60 mg of Solu-Medrol every 6

hours.  Will await dermatology consultation.

 

 

SHAHID PERRY M.D.

 

[place]/[id number]

DD: 04/12/2018 11:53

DT: 04/12/2018 12:54

Job #:  [id number]

## 2018-04-12 NOTE — PN
Physical Exam: 


SUBJECTIVE: Patient seen and examined in the ED awaiting bed placement.





OBJECTIVE:





See in the ED


generalized welts


Feels uncomfortable, itchy


will give IV Benadryl now and tonight


 Vital Signs











 Period  Temp  Pulse  Resp  BP Sys/Acosta  Pulse Ox


 


 Last 24 Hr  97.8 F-98.8 F  69-94  18-20  100-121/59-69  94-94








GENERAL: The patient is awake, alert, and fully oriented, in no acute distress.


HEAD: Normal with no signs of trauma.


EYES: PERRL, extraocular movements intact, sclera anicteric, conjunctiva clear. 

No ptosis. 


ENT: Ears normal, nares patent, oropharynx clear without exudates, moist mucous 

membranes.


NECK: Trachea midline, full range of motion, supple. 


LUNGS: Breath sounds equal, clear to auscultation bilaterally, no wheezes, no 

crackles, no accessory muscle use. 


HEART: Regular rate and rhythm, S1, S2 without murmur, rub or gallop.


ABDOMEN: Soft, nontender, nondistended, normoactive bowel sounds, no guarding, 

no 


rebound, no hepatosplenomegaly, no masses.


SKIN:  generalized welts, unclear etiology


 Laboratory Results - last 24 hr











  04/11/18 04/11/18 04/12/18





  12:11 16:40 07:40


 


WBC    3.6 L


 


RBC    5.55


 


Hgb    16.2


 


Hct    48.3


 


MCV    87.0


 


MCH    29.2


 


MCHC    33.6


 


RDW    13.8


 


Plt Count    273


 


MPV    9.2


 


Neutrophils %    57.3


 


Lymphocytes %    35.8  D


 


Monocytes %    5.4


 


Eosinophils %    1.4  D


 


Basophils %    0.1


 


ESR   


 


PT with INR   


 


INR   


 


Sodium   


 


Potassium   


 


Chloride   


 


Carbon Dioxide   


 


Anion Gap   


 


BUN   


 


Creatinine   


 


Creat Clearance w eGFR   


 


Random Glucose   


 


Lactic Acid   1.4 


 


Calcium   


 


Phosphorus   


 


Magnesium   


 


Total Bilirubin   


 


AST   


 


ALT   


 


Alkaline Phosphatase   


 


Creatine Kinase  65  


 


Troponin I  < 0.02  


 


C-Reactive Protein  14.7 H  


 


Total Protein   


 


Albumin   














  04/12/18 04/12/18 04/12/18





  07:40 07:40 07:40


 


WBC   


 


RBC   


 


Hgb   


 


Hct   


 


MCV   


 


MCH   


 


MCHC   


 


RDW   


 


Plt Count   


 


MPV   


 


Neutrophils %   


 


Lymphocytes %   


 


Monocytes %   


 


Eosinophils %   


 


Basophils %   


 


ESR   


 


PT with INR  15.80 H  


 


INR  1.40 H  


 


Sodium   140 


 


Potassium   4.4 


 


Chloride   107 


 


Carbon Dioxide   27 


 


Anion Gap   6 L 


 


BUN   19 H 


 


Creatinine   1.3 


 


Creat Clearance w eGFR   59.70 


 


Random Glucose   104  D 


 


Lactic Acid   


 


Calcium   7.9 L 


 


Phosphorus   2.0 L 


 


Magnesium   2.2 


 


Total Bilirubin   0.9 


 


AST   7 L D 


 


ALT   20  D 


 


Alkaline Phosphatase   58 


 


Creatine Kinase   


 


Troponin I   


 


C-Reactive Protein   22.7 H  Cancelled


 


Total Protein   5.4 L 


 


Albumin   2.6 L 














  04/12/18





  07:40


 


WBC 


 


RBC 


 


Hgb 


 


Hct 


 


MCV 


 


MCH 


 


MCHC 


 


RDW 


 


Plt Count 


 


MPV 


 


Neutrophils % 


 


Lymphocytes % 


 


Monocytes % 


 


Eosinophils % 


 


Basophils % 


 


ESR  6


 


PT with INR 


 


INR 


 


Sodium 


 


Potassium 


 


Chloride 


 


Carbon Dioxide 


 


Anion Gap 


 


BUN 


 


Creatinine 


 


Creat Clearance w eGFR 


 


Random Glucose 


 


Lactic Acid 


 


Calcium 


 


Phosphorus 


 


Magnesium 


 


Total Bilirubin 


 


AST 


 


ALT 


 


Alkaline Phosphatase 


 


Creatine Kinase 


 


Troponin I 


 


C-Reactive Protein 


 


Total Protein 


 


Albumin 








Active Medications











Generic Name Dose Route Start Last Admin





  Trade Name Freq  PRN Reason Stop Dose Admin


 


Diphenhydramine HCl  25 mg  04/11/18 17:24  04/12/18 11:35





  Benadryl -  PO   25 mg





  Q6H PRN   Administration





  urticaria   


 


Sodium Chloride  1,000 mls @ 100 mls/hr  04/11/18 17:30  04/12/18 05:20





  Normal Saline -  IV   100 mls/hr





  ASDIR QASIM   Administration


 


Methylprednisolone Sodium Succinate  60 mg  04/12/18 18:00  





  Solu-Medrol -  IVPUSH   





  Q8H-IV QASIM   


 


Morphine Sulfate  2 mg  04/11/18 17:59  





  Morphine Sulfate  IVPUSH  04/14/18 17:58  





  Q6H PRN   





  PAIN LEVEL 6-10   


 


Ondansetron HCl  4 mg  04/11/18 18:00  





  Zofran Injection  IVPUSH   





  Q6H PRN   





  NAUSEA   


 


Pantoprazole Sodium  40 mg  04/11/18 22:00  04/12/18 09:17





  Protonix Iv  IVPUSH   40 mg





  BID QASIM   Administration


 


Sucralfate  1 gm  04/11/18 22:00  04/12/18 09:14





  Carafate -  PO   1 gm





  BID QASIM   Administration











ASSESSMENT/PLAN:





Patient is a 47 year old male who comes to the ED with c/o of epigastria pain, 

non radiating.  Patient reports that on Saturday he ate pizza then developed a 

rash all over his body.  Rash persists, not getting better, denies any other 

possible cause that he can think of.  Not allergic to anything else that he is 

aware of.  No new soaps, meds, shampoos, foods.  Has cat x 6 months. 





Derm:


Generalized Rash, no wheezing


Now on Solumedrol 60mg q8 per ID


On Protonix IV BID for GI protection


Benadryl 25mg IV 


Morphine prn


ID consult and following


Derma consult.


Monitor for improvement





Card:


Hypotension improved with IVF


Continue IVF





F.E.N.


Fluids: NS @ 100 x 24 hours


Electrolytes: monitor


Nutrition: regular diet





Prophy: 


DVT: scds, ambulation


GI: Protonix BID





Disposition: full code.











Visit type





- Emergency Visit


Emergency Visit: Yes


ED Registration Date: 04/11/18


Care time: The patient presented to the Emergency Department on the above date 

and was hospitalized for further evaluation of their emergent condition.





- New Patient


This patient is new to me today: Yes


Date on this admission: 04/13/18





- Critical Care


Critical Care patient: No





- Discharge Referral


Referred to [facility] No

## 2018-04-13 RX ADMIN — TRIAMCINOLONE ACETONIDE SCH APPLIC: 1 CREAM TOPICAL at 21:38

## 2018-04-13 RX ADMIN — METHYLPREDNISOLONE SODIUM SUCCINATE SCH MG: 40 INJECTION, POWDER, FOR SOLUTION INTRAMUSCULAR; INTRAVENOUS at 23:58

## 2018-04-13 RX ADMIN — TRIAMCINOLONE ACETONIDE SCH APPLIC: 1 CREAM TOPICAL at 10:00

## 2018-04-13 RX ADMIN — PANTOPRAZOLE SODIUM SCH MG: 40 TABLET, DELAYED RELEASE ORAL at 17:00

## 2018-04-13 RX ADMIN — SUCRALFATE SCH GM: 1 TABLET ORAL at 10:00

## 2018-04-13 RX ADMIN — DIPHENHYDRAMINE HCL PRN MG: 25 CAPSULE ORAL at 21:37

## 2018-04-13 RX ADMIN — SUCRALFATE SCH GM: 1 TABLET ORAL at 21:37

## 2018-04-13 RX ADMIN — METHYLPREDNISOLONE SODIUM SUCCINATE SCH MG: 40 INJECTION, POWDER, FOR SOLUTION INTRAMUSCULAR; INTRAVENOUS at 01:15

## 2018-04-13 RX ADMIN — METHYLPREDNISOLONE SODIUM SUCCINATE SCH: 40 INJECTION, POWDER, FOR SOLUTION INTRAMUSCULAR; INTRAVENOUS at 10:00

## 2018-04-13 RX ADMIN — HYDROXYZINE HYDROCHLORIDE SCH MG: 25 TABLET, FILM COATED ORAL at 06:08

## 2018-04-13 RX ADMIN — PANTOPRAZOLE SODIUM SCH: 40 INJECTION, POWDER, FOR SOLUTION INTRAVENOUS at 10:00

## 2018-04-13 NOTE — PN
Physical Exam: 


SUBJECTIVE: Patient seen and examined, wife at bedside.





Patient feels improved, ambulating.  Not dizzy, not short of breath.





OBJECTIVE:





Rash/welts improving with some redness on chest


Vitals stable, stop IVF


Taper down IV steriods


Convert Protonix to PO


 Vital Signs











 Period  Temp  Pulse  Resp  BP Sys/Acosta  Pulse Ox


 


 Last 24 Hr  97.9 F-99.5 F    20-20  111-144/52-77  96-98








GENERAL: The patient is awake, alert, and fully oriented, in no acute distress.


HEAD: Normal with no signs of trauma.


EYES: PERRL, extraocular movements intact, sclera anicteric, conjunctiva clear. 

No ptosis. 


ENT: Ears normal, nares patent, oropharynx clear without exudates, moist mucous 

membranes.


NECK: Trachea midline, full range of motion, supple. 


LUNGS: Breath sounds equal, clear to auscultation bilaterally, no wheezes, no 

crackles, no accessory muscle use. 


HEART: Regular rate and rhythm, S1, S2 without murmur, rub or gallop.


ABDOMEN: Soft, nontender, nondistended, normoactive bowel sounds, no guarding, 

no 


rebound, no hepatosplenomegaly, no masses.


SKIN:  generalized welts, unclear etiology











 Laboratory Results - last 24 hr











  04/13/18 04/13/18





  01:13 06:03


 


POC Glucometer  138  150








Active Medications











Generic Name Dose Route Start Last Admin





  Trade Name Freq  PRN Reason Stop Dose Admin


 


Diphenhydramine HCl  25 mg  04/11/18 17:24  04/12/18 11:35





  Benadryl -  PO   25 mg





  Q6H PRN   Administration





  urticaria   


 


Hydroxyzine HCl  25 mg  04/13/18 07:00  04/13/18 06:08





  Atarax -  PO   25 mg





  AM QASIM   Administration


 


Sodium Chloride  1,000 mls @ 100 mls/hr  04/11/18 17:30  04/12/18 21:55





  Normal Saline -  IV   100 mls/hr





  ASDIR QASIM   Administration


 


Methylprednisolone Sodium Succinate  60 mg  04/12/18 18:00  04/13/18 01:15





  Solu-Medrol -  IVPUSH   60 mg





  Q8H-IV QASIM   Administration


 


Morphine Sulfate  2 mg  04/11/18 17:59  





  Morphine Sulfate  IVPUSH  04/14/18 17:58  





  Q6H PRN   





  PAIN LEVEL 6-10   


 


Ondansetron HCl  4 mg  04/11/18 18:00  





  Zofran Injection  IVPUSH   





  Q6H PRN   





  NAUSEA   


 


Pantoprazole Sodium  40 mg  04/11/18 22:00  04/12/18 21:49





  Protonix Iv  IVPUSH   40 mg





  BID QASIM   Administration


 


Sucralfate  1 gm  04/11/18 22:00  04/12/18 21:49





  Carafate -  PO   1 gm





  BID QASIM   Administration


 


Triamcinolone Acetonide  1 applic  04/12/18 22:00  04/12/18 22:21





  Aristocort 0.1% Cream -  TP   1 applic





  BID QASIM   Administration











ASSESSMENT/PLAN:





Patient is a 47 year old male who comes to the ED with c/o of epigastria pain, 

non radiating.  Patient reports that on Saturday he ate pizza then developed a 

rash all over his body.  Rash persists, not getting better, denies any other 

possible cause that he can think of.  Not allergic to anything else that he is 

aware of.  No new soaps, meds, shampoos, foods.  Has cat x 6 months. 





Derm:


Generalized Rash/Welts, no wheezing


Rash welts much improved today, still having bilateral arm edema


Will taper down steriods and monitor


Stop IVF


Protonix 40mg PO


Triamcinolone cream


Atarax daily


Benadryl PO prn


Morphine prn


ID consult and following


Derma consulted, notes reviewed


Consider transitioning to PO prednisone once symptoms resolve





Card:


Hypotension improved with IVF


stop IVF and monitor





F.E.N.


Fluids: stop IVF, Encouraged PO


Electrolytes: monitor


Nutrition: regular diet





Prophy: 


DVT: scds, ambulation


GI: Protonix 40mg daily





Disposition: full code.











Visit type





- Emergency Visit


Emergency Visit: Yes


ED Registration Date: 04/11/18


Care time: The patient presented to the Emergency Department on the above date 

and was hospitalized for further evaluation of their emergent condition.





- New Patient


This patient is new to me today: No





- Critical Care


Critical Care patient: No





- Discharge Referral


Referred to [facility] No

## 2018-04-14 VITALS — SYSTOLIC BLOOD PRESSURE: 126 MMHG | HEART RATE: 78 BPM | DIASTOLIC BLOOD PRESSURE: 65 MMHG | TEMPERATURE: 98.6 F

## 2018-04-14 LAB
ALBUMIN SERPL-MCNC: 2.5 G/DL (ref 3.4–5)
ALP SERPL-CCNC: 56 U/L (ref 45–117)
ALT SERPL-CCNC: 37 U/L (ref 12–78)
ANA NUCLEOLAR TITR SER: (no result) {TITER}
ANA SPECKLED TITR SER: (no result) {TITER}
ANION GAP SERPL CALC-SCNC: 9 MMOL/L (ref 8–16)
AST SERPL-CCNC: 23 U/L (ref 15–37)
BILIRUB SERPL-MCNC: 0.2 MG/DL (ref 0.2–1)
BUN SERPL-MCNC: 21 MG/DL (ref 7–18)
CALCIUM SERPL-MCNC: 7.8 MG/DL (ref 8.5–10.1)
CHLORIDE SERPL-SCNC: 108 MMOL/L (ref 98–107)
CO2 SERPL-SCNC: 24 MMOL/L (ref 21–32)
CREAT SERPL-MCNC: 1 MG/DL (ref 0.7–1.3)
DEPRECATED RDW RBC AUTO: 14 % (ref 11.9–15.9)
GLUCOSE SERPL-MCNC: 229 MG/DL (ref 74–106)
HCT VFR BLD CALC: 38.2 % (ref 35.4–49)
HGB BLD-MCNC: 12.9 GM/DL (ref 11.7–16.9)
MAGNESIUM SERPL-MCNC: 2.6 MG/DL (ref 1.8–2.4)
MCH RBC QN AUTO: 29.3 PG (ref 25.7–33.7)
MCHC RBC AUTO-ENTMCNC: 33.6 G/DL (ref 32–35.9)
MCV RBC: 87.1 FL (ref 80–96)
PLATELET # BLD AUTO: 306 K/MM3 (ref 134–434)
PLATELET BLD QL SMEAR: NORMAL
PMV BLD: 9 FL (ref 7.5–11.1)
POTASSIUM SERPLBLD-SCNC: 4.4 MMOL/L (ref 3.5–5.1)
PROT SERPL-MCNC: 5.3 G/DL (ref 6.4–8.2)
RBC # BLD AUTO: 4.39 M/MM3 (ref 4–5.6)
SODIUM SERPL-SCNC: 141 MMOL/L (ref 136–145)
WBC # BLD AUTO: 10.4 K/MM3 (ref 4–10)

## 2018-04-14 RX ADMIN — METHYLPREDNISOLONE SODIUM SUCCINATE SCH MG: 40 INJECTION, POWDER, FOR SOLUTION INTRAMUSCULAR; INTRAVENOUS at 09:35

## 2018-04-14 RX ADMIN — PANTOPRAZOLE SODIUM SCH MG: 40 TABLET, DELAYED RELEASE ORAL at 09:36

## 2018-04-14 RX ADMIN — HYDROXYZINE HYDROCHLORIDE SCH MG: 25 TABLET, FILM COATED ORAL at 06:10

## 2018-04-14 RX ADMIN — TRIAMCINOLONE ACETONIDE SCH APPLIC: 1 CREAM TOPICAL at 09:36

## 2018-04-14 RX ADMIN — SUCRALFATE SCH GM: 1 TABLET ORAL at 09:36

## 2024-09-04 NOTE — DS
Physical Examination


Vital Signs: 


 Vital Signs











Temperature  98.6 F   04/14/18 09:30


 


Pulse Rate  78   04/14/18 09:30


 


Respiratory Rate  17   04/14/18 09:30


 


Blood Pressure  126/65   04/14/18 09:30


 


O2 Sat by Pulse Oximetry (%)  97   04/14/18 09:00











Findings/Remarks: 





General: NAD, A&Ox3


Lungs: CTA bilaterally


Heart: RRR, S1S2


Abd: Soft, non-tender. Normoactive bowel sounds


Ext: Warm, well-perfused. 2+ DP/PT bilaterally


Skin: No urticaria noted


Labs: 


 CBC, BMP





 04/14/18 07:30 





 04/14/18 07:30 











Discharge Summary


Reason For Visit: BILIOUS VOMITING


Current Active Problems





Acute urticaria (Acute)


Fever (Acute)


Gastritis (Acute)


Vomiting bile (Acute)








Condition: Improved





- Instructions


Diet, Activity, Other Instructions: 


Please return to the ED with new, persistent, or worsening symptoms. Please 

follow-up with providers as indicated. 





Prednisone (steroid) taper:


4/15: 60mg in the morning


4/16: 40mg in the morning


4/17: 40mg in the morning


4/18: 40mg in the morning


4/19: 30mg in the morning


4/20: 30mg in the morning


4/21: 30mg in the morning


4/22: 20mg in the morning


4/23: 20mg in the morning


4/24: 20mg in the morning


4/25: 10mg in the morning


4/26: 10mg in the morning


4/27: 10mg in the morning





Continue taking Protonix 40mg by mouth every morning while on steroids.


Referrals: 


Roxana Gerber MD [Staff Physician] - 1 Week


Jefferson Frederick MD [Staff Physician] -  (Please follow-up with an allergist 

within 1 week for further allergy testing and workup regarding your hives.)


Kayode Zamora MD [Staff Physician] -  (Please follow-up with rheumatology 

within 1 week for further workup of your positive SUSAN screen.)


Yousif Marsh MD [Staff Physician] - 1 Week


Disposition: HOME





- Home Medications


Comprehensive Discharge Medication List: 


Ambulatory Orders





Dextroamphetamine/Amphetamine [Adderall 10 mg Tablet] 10 mg PO DAILY 04/08/18 


Methylphenidate HCl [Ritalin] 10 mg PO DAILY 04/08/18 


Diphenhydramine HCl [Benadryl Capsule -] 25 mg PO Q6H PRN  capsule 04/14/18 


Pantoprazole Sodium [Protonix -] 40 mg PO DAILY #30 tablet.ec 04/14/18 


Prednisone 10 mg PO ASDIR #36 tablet 04/14/18 


Triamcinolone 0.1% Cream [Aristocort 0.1% Cream -] 1 applic TP BID #1 tube 04/14 /18 


hydrOXYzine HCL [Atarax -] 25 mg PO AM #30 tablet 04/14/18 











- Discharge Referral


Referred to [facility] No patient